# Patient Record
(demographics unavailable — no encounter records)

---

## 2025-07-28 NOTE — ASSESSMENT
[FreeTextEntry1] :  -2 lbs Remain on Metformin 1,500 mg and Topamax 50 mg daily Activity as tolerated. Planning to re-focus on her nutrition since she is back home   RTO 9/2025

## 2025-07-28 NOTE — HISTORY OF PRESENT ILLNESS
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 Rosa    Started struggling with weight, fluctuation in the last 10 yrs  Past Wt Loss Attempts: IR Ozempic for 6 months last yr--stopped when data related to NAION came out.  Highest Adult Wt: 178 lbs  150s has been her steady state   Currently on Metformin  mg BID.    Dietary pattern:  Emotional eating  Mostly whole foods +cookies  Has cut back on ETOH since   Loss of control with eating: none  Do you wake up at night to eat: no Water Intake: ~60 oz    Exercise Regimen: Tries to walk; previously doing  Sleep: Sleep meditation; 6-7 hrs, disturbed--working on sleep for ~6 months  Stress Level: High associated with illness of family members  Gyn: post-fabrice Social Hx: Son in law having open heart in July (valve repair) Daughter has been ill --intractable Migraines    in his 50s Has good support    Labs: 2025 (retrieved from portal)  A1C 5.8%  Bun 19 Creatinine 0.66 GFR 95 AST/ALT   [place]: Fat%40%, Muscle mass 97.6lbs, Bone Mass 5.2lbs, BMR 1409, visceral fat ratin  25: Metformin to 1,500 mg daily and Topamax 25 mg daily in the evening. Saw OT for disordered sleep. Started seeing a therapist. Less cravings. Focused on eating whole foods, Targeting 100 grams of protein. Was away from home as her son in law had open heart surgery; doing well. Saw Endo, A1C 5.7%. Increased Topamax to 50 mg daily. Planning to go on a cruise 2025